# Patient Record
Sex: FEMALE | Race: WHITE | NOT HISPANIC OR LATINO | Employment: OTHER | URBAN - METROPOLITAN AREA
[De-identification: names, ages, dates, MRNs, and addresses within clinical notes are randomized per-mention and may not be internally consistent; named-entity substitution may affect disease eponyms.]

---

## 2018-12-10 ENCOUNTER — HOSPITAL ENCOUNTER (EMERGENCY)
Facility: HOSPITAL | Age: 38
Discharge: HOME | End: 2018-12-10
Attending: EMERGENCY MEDICINE
Payer: MEDICAID

## 2018-12-10 VITALS
SYSTOLIC BLOOD PRESSURE: 128 MMHG | RESPIRATION RATE: 19 BRPM | DIASTOLIC BLOOD PRESSURE: 86 MMHG | WEIGHT: 156 LBS | HEART RATE: 79 BPM | OXYGEN SATURATION: 100 % | TEMPERATURE: 98 F | HEIGHT: 66 IN | BODY MASS INDEX: 25.07 KG/M2

## 2018-12-10 DIAGNOSIS — K29.70 GASTRITIS, PRESENCE OF BLEEDING UNSPECIFIED, UNSPECIFIED CHRONICITY, UNSPECIFIED GASTRITIS TYPE: Primary | ICD-10-CM

## 2018-12-10 DIAGNOSIS — R07.9 CHEST PAIN: ICD-10-CM

## 2018-12-10 LAB
ALBUMIN SERPL BCP-MCNC: 3.9 G/DL
ALP SERPL-CCNC: 73 U/L
ALT SERPL W/O P-5'-P-CCNC: 62 U/L
ANION GAP SERPL CALC-SCNC: 9 MMOL/L
AST SERPL-CCNC: 32 U/L
B-HCG UR QL: NEGATIVE
BASOPHILS # BLD AUTO: 0.02 K/UL
BASOPHILS NFR BLD: 0.2 %
BILIRUB SERPL-MCNC: 0.3 MG/DL
BNP SERPL-MCNC: 19 PG/ML
BUN SERPL-MCNC: 11 MG/DL
CALCIUM SERPL-MCNC: 9.3 MG/DL
CHLORIDE SERPL-SCNC: 106 MMOL/L
CO2 SERPL-SCNC: 25 MMOL/L
CREAT SERPL-MCNC: 0.7 MG/DL
CTP QC/QA: YES
DIFFERENTIAL METHOD: ABNORMAL
EOSINOPHIL # BLD AUTO: 0.3 K/UL
EOSINOPHIL NFR BLD: 2.3 %
ERYTHROCYTE [DISTWIDTH] IN BLOOD BY AUTOMATED COUNT: 13 %
EST. GFR  (AFRICAN AMERICAN): >60 ML/MIN/1.73 M^2
EST. GFR  (NON AFRICAN AMERICAN): >60 ML/MIN/1.73 M^2
GLUCOSE SERPL-MCNC: 91 MG/DL
HCT VFR BLD AUTO: 37.4 %
HGB BLD-MCNC: 12.8 G/DL
LYMPHOCYTES # BLD AUTO: 2.8 K/UL
LYMPHOCYTES NFR BLD: 25.5 %
MCH RBC QN AUTO: 31.6 PG
MCHC RBC AUTO-ENTMCNC: 34.2 G/DL
MCV RBC AUTO: 92 FL
MONOCYTES # BLD AUTO: 0.8 K/UL
MONOCYTES NFR BLD: 7.4 %
NEUTROPHILS # BLD AUTO: 7.2 K/UL
NEUTROPHILS NFR BLD: 64.6 %
PLATELET # BLD AUTO: 289 K/UL
PMV BLD AUTO: 9.9 FL
POTASSIUM SERPL-SCNC: 3.8 MMOL/L
PROT SERPL-MCNC: 7 G/DL
RBC # BLD AUTO: 4.05 M/UL
SODIUM SERPL-SCNC: 140 MMOL/L
TROPONIN I SERPL DL<=0.01 NG/ML-MCNC: <0.006 NG/ML
WBC # BLD AUTO: 11.06 K/UL

## 2018-12-10 PROCEDURE — 83880 ASSAY OF NATRIURETIC PEPTIDE: CPT

## 2018-12-10 PROCEDURE — 81025 URINE PREGNANCY TEST: CPT | Performed by: EMERGENCY MEDICINE

## 2018-12-10 PROCEDURE — 85025 COMPLETE CBC W/AUTO DIFF WBC: CPT

## 2018-12-10 PROCEDURE — 84484 ASSAY OF TROPONIN QUANT: CPT

## 2018-12-10 PROCEDURE — 25000003 PHARM REV CODE 250: Performed by: EMERGENCY MEDICINE

## 2018-12-10 PROCEDURE — 93010 ELECTROCARDIOGRAM REPORT: CPT | Mod: ,,, | Performed by: INTERNAL MEDICINE

## 2018-12-10 PROCEDURE — 99285 EMERGENCY DEPT VISIT HI MDM: CPT | Mod: 25

## 2018-12-10 PROCEDURE — 80053 COMPREHEN METABOLIC PANEL: CPT

## 2018-12-10 PROCEDURE — 93005 ELECTROCARDIOGRAM TRACING: CPT

## 2018-12-10 RX ORDER — ACETAMINOPHEN 325 MG/1
650 TABLET ORAL EVERY 6 HOURS PRN
Qty: 13 TABLET | Refills: 0 | Status: SHIPPED | OUTPATIENT
Start: 2018-12-10

## 2018-12-10 RX ORDER — ASPIRIN 325 MG
325 TABLET ORAL
Status: COMPLETED | OUTPATIENT
Start: 2018-12-10 | End: 2018-12-10

## 2018-12-10 RX ADMIN — LIDOCAINE HYDROCHLORIDE: 20 SOLUTION ORAL; TOPICAL at 07:12

## 2018-12-10 RX ADMIN — ASPIRIN 325 MG ORAL TABLET 325 MG: 325 PILL ORAL at 07:12

## 2018-12-11 NOTE — DISCHARGE INSTRUCTIONS
Please refrain from eating greasy, spicy, citrus based, or tomato based products.  Please limit alcohol intake.

## 2018-12-11 NOTE — ED TRIAGE NOTES
"38 y.o female presents to the ED with chief complaint of chest pain. Patient reports chest pain beginning x4 days ago with associated "gas." Patient states "I've been burping a lot." Patient denies recent injury. Patient resting in bed in NAD. Side rails up x2.   "

## 2018-12-11 NOTE — ED PROVIDER NOTES
"Encounter Date: 12/10/2018    SCRIBE #1 NOTE: I, Gem Calderonchantale, am scribing for, and in the presence of,  Piyush Kaur MD. I have scribed the following portions of the note - Other sections scribed: ROS, HPI and PE.       History     Chief Complaint   Patient presents with    Chest Pain     " i was having chest pain" " i been having gas, burping alot" symptoms since 4 days off and on c/o mid chest pain described as " dull" "when i burp it feels better but when i sit down it gets worse"      CC: Chest Pain    HPI: This 38 y.o. Female prior hx of Kidney Stones, presents to the ED complaining of an acute onset, intermittent substernal chest pain (5/10) described as burning for last 4 days. Pain is worse with lying down. She recently came home from a trip in [place] and had spicy [place] food there. She reports eating spicy and greasy food on a regular basis. She reports occasional alcohol use, last glass of wine was yesterday. She reports frequent belching and bloating with her chest pain. Denies abdominal pain, N/V/D/C, rash or any urinary sx. No reported prior medical intervention.     Occasional nicotine use via vaping. No recreational drug use.      The history is provided by the patient. No  was used.     Review of patient's allergies indicates:  No Known Allergies  History reviewed. No pertinent past medical history.  History reviewed. No pertinent surgical history.  History reviewed. No pertinent family history.  Social History     Tobacco Use    Smoking status: Never Smoker    Smokeless tobacco: Never Used   Substance Use Topics    Alcohol use: No     Frequency: Never    Drug use: No     Review of Systems   Constitutional: Negative for fever.   HENT: Negative for trouble swallowing.    Respiratory: Negative for cough and shortness of breath.    Cardiovascular: Positive for chest pain (substernal, burning).   Gastrointestinal: Negative for abdominal pain, diarrhea, nausea and vomiting.      "   (+) belching, (+) bloating   Genitourinary: Negative for dysuria.       Physical Exam     Initial Vitals [12/10/18 1853]   BP Pulse Resp Temp SpO2   131/82 76 20 98.3 °F (36.8 °C) 100 %      MAP       --         Physical Exam    Nursing note and vitals reviewed.  Constitutional: She appears well-developed and well-nourished.   HENT:   Head: Normocephalic and atraumatic.   Eyes: EOM are normal. Pupils are equal, round, and reactive to light.   Neck: Normal range of motion.   Cardiovascular: Normal rate and regular rhythm.   Pulmonary/Chest: Breath sounds normal. No stridor. No respiratory distress. She has no wheezes.   Abdominal: Soft. Bowel sounds are normal. She exhibits no distension. There is no tenderness.   Musculoskeletal: Normal range of motion. She exhibits no edema.   Neurological: She is alert and oriented to person, place, and time. GCS score is 15. GCS eye subscore is 4. GCS verbal subscore is 5. GCS motor subscore is 6.   Skin: Skin is warm and dry. Capillary refill takes less than 2 seconds.   Psychiatric: She has a normal mood and affect. Thought content normal.         ED Course   Procedures  Labs Reviewed   CBC W/ AUTO DIFFERENTIAL - Abnormal; Notable for the following components:       Result Value    MCH 31.6 (*)     All other components within normal limits   COMPREHENSIVE METABOLIC PANEL - Abnormal; Notable for the following components:    ALT 62 (*)     All other components within normal limits   TROPONIN I   B-TYPE NATRIURETIC PEPTIDE   POCT URINE PREGNANCY     EKG Readings: (Independently Interpreted)   Initial Reading: No STEMI. Rhythm: Normal Sinus Rhythm. Heart Rate: 68. ST Segments: Normal ST Segments. T Waves: Normal. Axis: Normal.   Sinus rhythm with short [place]. Otherwise normal ECG.       Imaging Results          X-Ray Chest AP Portable (Final result)  Result time 12/10/18 20:10:12    Final result by Xavier Cazares MD (12/10/18 20:10:12)                 Impression:      No acute  findings in the chest.      Electronically signed by: Xavier Cazares MD  Date:    12/10/2018  Time:    20:10             Narrative:    EXAMINATION:  XR CHEST AP PORTABLE    CLINICAL HISTORY:  Chest Pain;    TECHNIQUE:  Single frontal view of the chest was performed.    COMPARISON:  None    FINDINGS:  No consolidation, pleural effusion or pneumothorax.    Cardiomediastinal silhouette is unremarkable.                                 Medical Decision Making:   Initial Assessment:   38 year old patient with hx of kidney stones presenting secondary to chest pain. Patient is at lower risk of ACS due to risk factors and HPI with a heart score of 0. Patient has received an aspirin. EKG was reassuring and chest xray showed nothing acute. Most likely gastritis.  Given a GI cocktail here.      [email or web address]    Also considered but less likely:     PE: normal rate, no sob/recent immobilization/surgery/travel/family history. PERC 0  Pneumonia: chest xray negative. No fever. No cough and lungs non consistent with pna  Tamponade: unlikely due to chest xray and ekg  STEMI: No STEMI on ekg  Dissection: equal pulses bilaterally and no ripping chest pain to the back  Esophageal rupture: no dysphagia or vomiting and chest xray negative for mediastinal air  Arrhythmia: no arrhythmia on ekg  CHF: no fluid overload on Cxr and physical exam  Pneumothorax: bilateral breath sounds and no signs of pneumothorax on chest xray     Patient felt improved with interventions and has a lower risk of impending cardiac failure to the heart score of 0.  Return precautions given, patient understands and agrees with plan. All questions answered.  Instructed to follow up with PCP. Will discharge home with ranitidine. I discussed with the patient the diagnosis, treatment plan, indications for return to the emergency department, and for expected follow-up. The patient verbalized an understanding. The patient is asked  if there are any questions or concerns. We discuss the case, until all issues are addressed to the patients satisfaction. Patient understands and is agreeable to the plan.   Piyush Kaur      Clinical Tests:   Lab Tests: Ordered and Reviewed  Radiological Study: Reviewed and Ordered  Medical Tests: Ordered and Reviewed            Scribe Attestation:   Scribe #1: I performed the above scribed service and the documentation accurately describes the services I performed. I attest to the accuracy of the note.    Attending Attestation:           Physician Attestation for Scribe:  Physician Attestation Statement for Scribe #1: I, Piyush Kaur MD, reviewed documentation, as scribed by Gem Granger in my presence, and it is both accurate and complete.                    Clinical Impression:   The primary encounter diagnosis was Gastritis, presence of bleeding unspecified, unspecified chronicity, unspecified gastritis type. A diagnosis of Chest pain was also pertinent to this visit.                             Piyush Kaur MD  12/10/18 4835

## 2020-12-19 ENCOUNTER — HOSPITAL ENCOUNTER (EMERGENCY)
Facility: HOSPITAL | Age: 40
Discharge: HOME | End: 2020-12-19
Attending: EMERGENCY MEDICINE
Payer: COMMERCIAL

## 2020-12-19 VITALS
WEIGHT: 165 LBS | HEART RATE: 73 BPM | DIASTOLIC BLOOD PRESSURE: 80 MMHG | HEIGHT: 66 IN | RESPIRATION RATE: 18 BRPM | BODY MASS INDEX: 26.52 KG/M2 | SYSTOLIC BLOOD PRESSURE: 114 MMHG | TEMPERATURE: 98 F | OXYGEN SATURATION: 97 %

## 2020-12-19 DIAGNOSIS — R52 PAIN: ICD-10-CM

## 2020-12-19 DIAGNOSIS — M79.605 ACUTE PAIN OF LEFT LOWER EXTREMITY: Primary | ICD-10-CM

## 2020-12-19 LAB
B-HCG UR QL: NEGATIVE
CTP QC/QA: YES

## 2020-12-19 PROCEDURE — 81025 URINE PREGNANCY TEST: CPT | Performed by: PHYSICIAN ASSISTANT

## 2020-12-19 PROCEDURE — 25000003 PHARM REV CODE 250: Performed by: PHYSICIAN ASSISTANT

## 2020-12-19 PROCEDURE — 99284 EMERGENCY DEPT VISIT MOD MDM: CPT | Mod: 25

## 2020-12-19 RX ORDER — NAPROXEN 500 MG/1
500 TABLET ORAL 2 TIMES DAILY WITH MEALS
Status: DISCONTINUED | OUTPATIENT
Start: 2020-12-19 | End: 2020-12-19 | Stop reason: HOSPADM

## 2020-12-19 RX ORDER — NAPROXEN 500 MG/1
500 TABLET ORAL 2 TIMES DAILY WITH MEALS
Qty: 20 TABLET | Refills: 0 | Status: SHIPPED | OUTPATIENT
Start: 2020-12-19

## 2020-12-19 RX ADMIN — NAPROXEN 500 MG: 500 TABLET ORAL at 07:12

## 2020-12-19 NOTE — ED PROVIDER NOTES
Encounter Date: 12/19/2020    SCRIBE #1 NOTE: I, Dimitry Garcia, am scribing for, and in the presence of,  Daysi Muir PA-C. I have scribed the following portions of the note - Other sections scribed: HPI, ROS, PE.       History     Chief Complaint   Patient presents with    Leg Pain     pain behind the L knee since last PM, denies trauma       Kalli Bradley is a 40 y.o. female who presents to the ED with complaints of pain to the back of her left leg since 8 PM last night. Pain is located primarily in the dorsal aspect of her left thigh and radiates down to her upper calf. Denies any specific activities leading to the onset of the pain however does state that she did multiple things around the house yesterday including changing some door knobs and handles. She denies any fall or known injury. She states that the pain is worse with flexion of leg and lifting leg. She does have minimal pain at rest. No reported swelling. Denies any chest pain, shortness of breath, knee pain, nausea, vomiting, or diarrhea. Patient took Aspirin 81 mg PTA without any relief. Denies any recent travel or surgeries. Denies Hx of blood clots. She admits to chewing Nicotine gum.     The history is provided by the patient. No  was used.     Review of patient's allergies indicates:   Allergen Reactions    Dilaudid [hydromorphone (bulk)] Itching     No past medical history on file.  No past surgical history on file.  No family history on file.  Social History     Tobacco Use    Smoking status: Never Smoker    Smokeless tobacco: Never Used   Substance Use Topics    Alcohol use: No     Frequency: Never    Drug use: No     Review of Systems   Constitutional: Negative for activity change, appetite change, chills, fatigue and fever.   HENT: Negative for congestion, rhinorrhea and sore throat.    Eyes: Negative for photophobia and visual disturbance.   Respiratory: Negative for cough, shortness of breath and  wheezing.    Cardiovascular: Negative for chest pain.   Gastrointestinal: Negative for abdominal pain, diarrhea, nausea and vomiting.   Genitourinary: Negative for dysuria, hematuria and urgency.   Musculoskeletal: Negative for back pain, myalgias and neck pain.        Left leg pain   Skin: Negative for color change and wound.   Neurological: Negative for weakness and headaches.   Psychiatric/Behavioral: Negative for agitation and confusion.       Physical Exam     Initial Vitals [12/19/20 0648]   BP Pulse Resp Temp SpO2   120/71 95 18 98 °F (36.7 °C) 96 %      MAP       --         Physical Exam    Nursing note and vitals reviewed.  Constitutional: Vital signs are normal. She appears well-developed and well-nourished. She is not diaphoretic. She is cooperative.  Non-toxic appearance. She does not have a sickly appearance. She does not appear ill. No distress.   Well appearing,  female accompanied by her wife in the ED. Speaking in clear and full sentences, moving all extremities without difficulty, no acute distress. Makes good eye contact.    HENT:   Head: Normocephalic and atraumatic.   Eyes: Conjunctivae and EOM are normal. Pupils are equal, round, and reactive to light. No scleral icterus.   Neck: Normal range of motion. Neck supple.   Cardiovascular: Normal rate, regular rhythm, normal heart sounds and intact distal pulses. Exam reveals no gallop and no friction rub.    No murmur heard.  Pulses:       Dorsalis pedis pulses are 2+ on the right side and 2+ on the left side.        Posterior tibial pulses are 2+ on the right side and 2+ on the left side.   Pulmonary/Chest: Breath sounds normal. No respiratory distress. She has no wheezes. She has no rhonchi. She has no rales.   Abdominal: Soft. Bowel sounds are normal. She exhibits no distension. There is no abdominal tenderness. There is no rebound and no guarding.   Musculoskeletal:      Comments: Tenderness to palpation of LLE along posterior calf and  popliteal region. No tenderness to palpation along anterior knee. No crepitus, step offs, or bony deformities. Increased pain with flexion and extension of left knee. No erythema, warmth, or edema. Normal ROM, sensation, strength.   Neurological: She is alert and oriented to person, place, and time. She has normal strength. No cranial nerve deficit.   Skin: Skin is warm and dry. No rash noted.   Psychiatric: She has a normal mood and affect.         ED Course   Procedures  Labs Reviewed   POCT URINE PREGNANCY          Imaging Results          US Lower Extremity Veins Left (Final result)  Result time 12/19/20 08:02:19    Final result by Abraham Kapoor DO (12/19/20 08:02:19)                 Impression:      No evidence of deep venous thrombosis in the left lower extremity.      Electronically signed by: Abraham Kapoor  Date:    12/19/2020  Time:    08:02             Narrative:    EXAMINATION:  US LOWER EXTREMITY VEINS LEFT    CLINICAL HISTORY:  Left lower extremity pain.    TECHNIQUE:  Duplex and color flow Doppler evaluation and graded compression of the left lower extremity veins was performed.    COMPARISON:  None    FINDINGS:  Left thigh veins: The common femoral, femoral, popliteal, upper greater saphenous, and deep femoral veins are patent and free of thrombus. The veins are normally compressible and have normal phasic flow and augmentation response.    Left calf veins: The visualized calf veins are patent.    Contralateral CFV: The contralateral (right) common femoral vein is patent and free of thrombus.    Miscellaneous: None                                 Medical Decision Making:   Initial Assessment:     Urgent evaluation of a 40 y.o. female presenting to the emergency department complaining of left lower extremity pain since last night. Patient is afebrile, nontoxic appearing and hemodynamically stable. Physical exam reveals tenderness to palpation the left lower extremity along popliteal posterior calf.   No palpable cord.  Neurovascularly intact.  No history of prior DVTs or increased risk factors.  Will plan for ultrasound, analgesics and reassess.      Clinical Tests:   Lab Tests: Ordered and Reviewed  ED Management:    Ultrasound is negative for DVT. No bony deformity, tenderness or history of trauma to indicate x-ray or further imaging. Unclear etiology of patient's symptoms, however likely musculoskeletal.  Patient does not have any open wounds, no skin changes including erythema or warmth.  No evidence of a septic joint.  No signs of bacterial infection require antibiotics.  Will go ahead and treat with additional NSAIDs and counseled on rice therapy.  Urged to obtain close follow-up with PCP for further evaluation management.  Discharged home in stable condition.The patient was instructed to follow up with a primary care provider in 2 days or to return to the emergency department for worsening symptoms. The treatment plan was discussed with the patient who demonstrated understanding and comfort with plan. The patient's history, physical exam, and plan of care was discussed with and agreed upon with my supervising physician.       This note was created using [employer]. There may be typographical errors secondary to dictation.               Scribe Attestation:   Scribe #1: I performed the above scribed service and the documentation accurately describes the services I performed. I attest to the accuracy of the note.                      Clinical Impression:     ICD-10-CM ICD-9-CM   1. Acute pain of left lower extremity  M79.605 729.5   2. Pain  R52 780.96                          ED Disposition Condition    Discharge Stable         I, Daysi Muir, personally performed the services described in this documentation. All medical record entries made by the scribe were at my direction and in my presence. I have reviewed the chart and agree that the record reflects my personal performance and is  accurate and complete.      ED Prescriptions     Medication Sig Dispense Start Date End Date Auth. Provider    naproxen (NAPROSYN) 500 MG tablet Take 1 tablet (500 mg total) by mouth 2 (two) times daily with meals. 20 tablet 12/19/2020  Daysi Muir PA-C        Follow-up Information     Follow up With Specialties Details Why Contact Info    Miya Kunz MD Family Medicine Schedule an appointment as soon as possible for a visit   [address]  [address]  [facility] [address] [phone]      [facility]  If symptoms worsen [address]  [facility] [address] [phone]                                       Daysi Muir PA-C  12/19/20 0813

## 2020-12-19 NOTE — ED NOTES
Patient reports onset of intermittent sharp left popliteal pain with position change last night at approx 8pm.  Denies injury or predisposing activity.  Acknowledges occasional tingling in LLE.

## 2021-03-31 ENCOUNTER — IMMUNIZATION (OUTPATIENT)
Dept: PRIMARY CARE CLINIC | Facility: CLINIC | Age: 41
End: 2021-03-31
Payer: COMMERCIAL

## 2021-03-31 DIAGNOSIS — Z23 NEED FOR VACCINATION: Primary | ICD-10-CM

## 2021-03-31 PROCEDURE — 0031A PR IMMUNIZ ADMIN, SARS-COV-2 COVID-19 VACC, 5X10VP/0.5ML: CPT | Mod: CV19,S$GLB,, | Performed by: INTERNAL MEDICINE

## 2021-03-31 PROCEDURE — 0031A PR IMMUNIZ ADMIN, SARS-COV-2 COVID-19 VACC, 5X10VP/0.5ML: ICD-10-PCS | Mod: CV19,S$GLB,, | Performed by: INTERNAL MEDICINE

## 2021-03-31 PROCEDURE — 91303 PR SARSCOV2 VAC AD26 .5ML IM: ICD-10-PCS | Mod: S$GLB,,, | Performed by: INTERNAL MEDICINE

## 2021-03-31 PROCEDURE — 91303 PR SARSCOV2 VAC AD26 .5ML IM: CPT | Mod: S$GLB,,, | Performed by: INTERNAL MEDICINE
